# Patient Record
Sex: MALE | Race: WHITE | URBAN - METROPOLITAN AREA
[De-identification: names, ages, dates, MRNs, and addresses within clinical notes are randomized per-mention and may not be internally consistent; named-entity substitution may affect disease eponyms.]

---

## 2019-07-17 ENCOUNTER — OFFICE VISIT (OUTPATIENT)
Dept: CARDIOLOGY CLINIC | Age: 39
End: 2019-07-17

## 2019-07-17 VITALS
SYSTOLIC BLOOD PRESSURE: 142 MMHG | BODY MASS INDEX: 28 KG/M2 | HEIGHT: 75 IN | HEART RATE: 94 BPM | RESPIRATION RATE: 18 BRPM | WEIGHT: 225.2 LBS | DIASTOLIC BLOOD PRESSURE: 98 MMHG

## 2019-07-17 DIAGNOSIS — R00.2 PALPITATIONS: Primary | ICD-10-CM

## 2019-07-17 RX ORDER — PHENOL/SODIUM PHENOLATE
20 AEROSOL, SPRAY (ML) MUCOUS MEMBRANE DAILY
COMMUNITY

## 2019-07-17 RX ORDER — VALACYCLOVIR HYDROCHLORIDE 1 G/1
TABLET, FILM COATED ORAL DAILY
COMMUNITY

## 2019-07-17 RX ORDER — FLUOXETINE 10 MG/1
CAPSULE ORAL DAILY
COMMUNITY

## 2019-07-17 NOTE — PROGRESS NOTES
HISTORY OF PRESENTING ILLNESS      Osmani Tran is a 42 y.o. male with history of tachycardia x2 and findings of ventricular preexcitation on EKG who presents to discuss advance rhythm strategy options after being intolerant of metoprolol. He has considerable anxiety regarding recurrent episodes of tachycardia and has recently been started on fluoxetine. ACTIVE PROBLEM LIST     Patient Active Problem List    Diagnosis Date Noted    Lita-Parkinson-White syndrome 11/15/2010    SVT (supraventricular tachycardia) ([facility] [address] 11/15/2010           PAST MEDICAL HISTORY     Past Medical History:   Diagnosis Date    SVT (supraventricular tachycardia) ([facility] [address] 11/15/2010    Lita-Parkinson-White syndrome 11/15/2010           PAST SURGICAL HISTORY     No past surgical history on file. ALLERGIES     Allergies no known allergies       FAMILY HISTORY     No family history on file. negative for cardiac disease       SOCIAL HISTORY     Social History     Socioeconomic History    Marital status: SINGLE     Spouse name: Not on file    Number of children: Not on file    Years of education: Not on file    Highest education level: Not on file         MEDICATIONS     Current Outpatient Medications   Medication Sig    METOPROLOL SUCCINATE PO Take 25 mg by mouth two (2) times a day.  OMEPRAZOLE (PRILOSEC PO) Take  by mouth daily. No current facility-administered medications for this visit. I have reviewed the nurses notes, vitals, problem list, allergy list, medical history, family, social history and medications. REVIEW OF SYMPTOMS      General: Pt denies excessive weight gain or loss. Pt is able to conduct ADL's  HEENT: Denies blurred vision, headaches, hearing loss, epistaxis and difficulty swallowing. Respiratory: Denies cough, congestion, shortness of breath, POON, wheezing or stridor.   Cardiovascular: Denies precordial pain, palpitations, edema or PND  Gastrointestinal: Denies poor appetite, indigestion, abdominal pain or blood in stool  Genitourinary: Denies hematuria, dysuria, increased urinary frequency  Musculoskeletal: Denies joint pain or swelling from muscles or joints  Neurologic: Denies tremor, paresthesias, headache, or sensory motor disturbance  Psychiatric: Denies confusion, insomnia, depression  Integumentray: Denies rash, itching or ulcers. Hematologic: Denies easy bruising, bleeding       PHYSICAL EXAMINATION      There were no vitals filed for this visit. General: Well developed, in no acute distress. HEENT: No jaundice, oral mucosa moist, no oral ulcers  Neck: Supple, no stiffness, no lymphadenopathy, supple  Heart:  Normal S1/S2 negative S3 or S4. Regular, no murmur, gallop or rub, no jugular venous distention  Respiratory: Clear bilaterally x 4, no wheezing or rales  Abdomen:   Soft, non-tender, bowel sounds are active.   Extremities:  No edema, normal cap refill, no cyanosis. Musculoskeletal: No clubbing, no deformities  Neuro: A&Ox3, speech clear, gait stable, cooperative, no focal neurologic deficits  Skin: Skin color is normal. No rashes or lesions.  Non diaphoretic, moist.  Vascular: 2+ pulses symmetric in all extremities       DIAGNOSTIC DATA      EKG: Sinus rhythm with ventricular preexcitation     LABORATORY DATA      Lab Results   Component Value Date/Time    WBC 5.5 12/19/2009 03:30 AM    HGB 13.5 12/19/2009 03:30 AM    HCT 41.5 12/19/2009 03:30 AM    PLATELET 957 26/81/6034 03:30 AM    MCV 88.9 12/19/2009 03:30 AM      Lab Results   Component Value Date/Time    Sodium 139 12/19/2009 03:30 AM    Potassium 3.9 12/19/2009 03:30 AM    Chloride 109 (H) 12/19/2009 03:30 AM    CO2 25 12/19/2009 03:30 AM    Anion gap 5 12/19/2009 03:30 AM    Glucose 101 (H) 12/19/2009 03:30 AM    BUN 11 12/19/2009 03:30 AM    Creatinine 0.9 12/19/2009 03:30 AM    BUN/Creatinine ratio 12 12/19/2009 03:30 AM    GFR est AA >60 12/19/2009 03:30 AM    GFR est non-AA >60 12/19/2009 03:30 AM Calcium 8.3 (L) 12/19/2009 03:30 AM    Bilirubin, total 0.4 12/18/2009 03:34 AM    AST (SGOT) 52 (H) 12/18/2009 03:34 AM    Alk. phosphatase 110 12/18/2009 03:34 AM    Protein, total 7.4 12/18/2009 03:34 AM    Albumin 3.6 12/18/2009 03:34 AM    Globulin 3.8 12/18/2009 03:34 AM    A-G Ratio 0.9 (L) 12/18/2009 03:34 AM    ALT (SGPT) 105 (H) 12/18/2009 03:34 AM           ASSESSMENT      1. Ventricular preexcitation   2. Supraventricular tachycardia  3. Anxiety         PLAN     Discussed options of an alternate antiarrhythmic drug versus SVT ablation. He wishes to consider his options and will notify us on whether to proceed with ablation. Should he opt to proceed with ablation we will plan for an SVT ablation at [facility] with MAC anesthesia. FOLLOW-UP     TBD      Thank you, Dr. Linda Montgomery for allowing me to participate in the care of this extraordinarily pleasant male. Please do not hesitate to contact me for further questions/concerns.          Gia Barr MD  Cardiac Electrophysiology / Cardiology    [address]  [address] [facility], [facility], [address] [place] [address] Drive    [address] [facility]  [phone] / [phone] Fax   [phone] / [phone] Fax

## 2019-07-17 NOTE — PROGRESS NOTES
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